# Patient Record
Sex: MALE | Race: WHITE | URBAN - METROPOLITAN AREA
[De-identification: names, ages, dates, MRNs, and addresses within clinical notes are randomized per-mention and may not be internally consistent; named-entity substitution may affect disease eponyms.]

---

## 2019-10-31 ENCOUNTER — APPOINTMENT (RX ONLY)
Dept: URBAN - METROPOLITAN AREA CLINIC 141 | Facility: CLINIC | Age: 74
Setting detail: DERMATOLOGY
End: 2019-10-31

## 2019-10-31 DIAGNOSIS — L57.8 OTHER SKIN CHANGES DUE TO CHRONIC EXPOSURE TO NONIONIZING RADIATION: ICD-10-CM

## 2019-10-31 DIAGNOSIS — D22 MELANOCYTIC NEVI: ICD-10-CM

## 2019-10-31 PROBLEM — E78.5 HYPERLIPIDEMIA, UNSPECIFIED: Status: ACTIVE | Noted: 2019-10-31

## 2019-10-31 PROBLEM — D48.5 NEOPLASM OF UNCERTAIN BEHAVIOR OF SKIN: Status: ACTIVE | Noted: 2019-10-31

## 2019-10-31 PROCEDURE — 99202 OFFICE O/P NEW SF 15 MIN: CPT | Mod: 25

## 2019-10-31 PROCEDURE — ? SUNSCREEN RECOMMENDATIONS

## 2019-10-31 PROCEDURE — 11102 TANGNTL BX SKIN SINGLE LES: CPT

## 2019-10-31 PROCEDURE — ? BIOPSY BY SHAVE METHOD

## 2019-10-31 ASSESSMENT — LOCATION SIMPLE DESCRIPTION DERM
LOCATION SIMPLE: LEFT NOSE
LOCATION SIMPLE: LEFT FOREHEAD

## 2019-10-31 ASSESSMENT — LOCATION ZONE DERM
LOCATION ZONE: NOSE
LOCATION ZONE: FACE

## 2019-10-31 ASSESSMENT — LOCATION DETAILED DESCRIPTION DERM
LOCATION DETAILED: LEFT INFERIOR MEDIAL FOREHEAD
LOCATION DETAILED: LEFT NASAL SIDEWALL

## 2019-10-31 NOTE — PROCEDURE: SUNSCREEN RECOMMENDATIONS
Products Recommended: Elta MD sunscreen Products
General Sunscreen Counseling: I highly recommend the use of broad spectrum sun screen (UVA AND UVB protection) with an SPF 30 or higher.  Physical sunscreens are preferred over chemical sunscreens as they reflect the sun’s rays instead of absorbing and scattering them.  Physical sunscreens are found in the form of the minerals, Zinc Oxide and Titanium Dioxide.  A mix of both chemical and physical is okay, as well. Applying sunscreen every morning regardless of your planned daily activity is important.  Sunscreen is not just for the beach.  Studies shows, that we are exposed to the sun 18 to 20 hours of incidental sun per week (meaning, times when you are going to and from the car, walking the dog, checking the mail, etc.)  Skin cancer is more common on the left side of the body due to the exposure that we receive while driving a car. Amount does matter!  Use a “quarter size” for areas face, neck and chest.  A “quarter size” per arm and per leg if wearing shorts, skirts or capris. If at the beach, pool and/or park reapply every two hours.  Use 1 ounce or a shot glass every two hours if wearing a bathing suit.  This means that after spending a day outside you should run through a whole 8 ounce bottle of sunscreen. Keep in mind that the worse sun hours are 10:00am thru 3:00pm.  If possible, avoid being outside within that time period. I also recommended a lip balm with a sunscreen as well. Sun protective clothing can be used in lieu of sunscreen but must be worn the entire time you are exposed to the sun's rays.
Detail Level: Zone

## 2019-10-31 NOTE — PROCEDURE: BIOPSY BY SHAVE METHOD
Anesthesia Volume In Cc (Will Not Render If 0): 0.5
Post-Care Instructions: I reviewed with the patient in detail post-care instructions.TWICE A DAY –  Every morning and evening to allow for optimal healing:\\n1.  Wash hands with soap and water.\\n2. Cleanse the wound gently with soap and water.  This can be done in the shower. \\n3. Dry the area gently with a clean towel or gauze pad.\\n4. Apply a thin layer of petroleum jelly (Vaseline or Aquaphor) or \\nantibiotic ointment (Polysporin/Bacitracin) and cover with a bandage.\\n5. If sutures were placed, please have suture removed in _______days.\\n6. Repeat wound care twice daily until completely healed.\\Ellie the skin heals, the biopsy site will have a dark pink-dark red appearance, often a light yellowish/cobblestone like film over the biopsy site and the biopsy site edges will be red.  If you site becomes increasingly red, inflamed, swollen, unusually tender, has drainage, or you have any questions/concerns, please call the office.
Electrodesiccation Text: The wound bed was treated with electrodesiccation after the biopsy was performed.
Biopsy Type: H and E
Render In Bullet Format When Appropriate: No
Wound Care: Petrolatum
X Size Of Lesion In Cm: 0
Lab Facility: [id number]
Dressing: bandage
Notification Instructions: Patient will be notified of biopsy results. However, patient instructed to call the office if not contacted within 2 weeks.
Was A Bandage Applied: Yes
Consent: Written consent was obtained and risks were reviewed including but not limited to scarring, infection, bleeding, scabbing, incomplete removal, nerve damage and allergy to anesthesia.
Detail Level: Detailed
Biopsy Method: Personna blade
Electrodesiccation And Curettage Text: The wound bed was treated with electrodesiccation and curettage after the biopsy was performed.
Type Of Destruction Used: Curettage
Silver Nitrate Text: The wound bed was treated with silver nitrate after the biopsy was performed.
Hemostasis: Carmelo's
Lab: 6
Depth Of Biopsy: dermis
Cryotherapy Text: The wound bed was treated with cryotherapy after the biopsy was performed.
Billing Type: Third-Party Bill
Size Of Lesion In Cm: 0.6
Curettage Text: The wound bed was treated with curettage after the biopsy was performed.
Anesthesia Type: 0.5% bupivacaine